# Patient Record
Sex: FEMALE | Race: WHITE | Employment: PART TIME | URBAN - METROPOLITAN AREA
[De-identification: names, ages, dates, MRNs, and addresses within clinical notes are randomized per-mention and may not be internally consistent; named-entity substitution may affect disease eponyms.]

---

## 2021-06-05 ENCOUNTER — HOSPITAL ENCOUNTER (EMERGENCY)
Age: 41
Discharge: HOME | End: 2021-06-05
Payer: MEDICARE

## 2021-06-05 ENCOUNTER — APPOINTMENT (OUTPATIENT)
Dept: GENERAL RADIOLOGY | Age: 41
End: 2021-06-05
Payer: MEDICARE

## 2021-06-05 VITALS
HEART RATE: 98 BPM | TEMPERATURE: 97.9 F | WEIGHT: 185 LBS | RESPIRATION RATE: 18 BRPM | OXYGEN SATURATION: 99 % | DIASTOLIC BLOOD PRESSURE: 64 MMHG | HEIGHT: 65 IN | SYSTOLIC BLOOD PRESSURE: 117 MMHG | BODY MASS INDEX: 30.82 KG/M2

## 2021-06-05 DIAGNOSIS — M25.561 ACUTE PAIN OF RIGHT KNEE: Primary | ICD-10-CM

## 2021-06-05 PROCEDURE — 73562 X-RAY EXAM OF KNEE 3: CPT

## 2021-06-05 PROCEDURE — 73590 X-RAY EXAM OF LOWER LEG: CPT

## 2021-06-05 PROCEDURE — 99285 EMERGENCY DEPT VISIT HI MDM: CPT

## 2021-06-05 RX ORDER — NAPROXEN SODIUM 550 MG/1
550 TABLET ORAL 2 TIMES DAILY WITH MEALS
Qty: 20 TABLET | Refills: 0 | OUTPATIENT
Start: 2021-06-05 | End: 2022-04-23

## 2021-06-05 RX ORDER — LEVOTHYROXINE SODIUM 137 UG/1
137 TABLET ORAL DAILY
COMMUNITY

## 2021-06-05 RX ORDER — MULTIVIT-MIN/IRON/FOLIC ACID/K 18-600-40
CAPSULE ORAL
COMMUNITY

## 2021-06-05 RX ORDER — IBUPROFEN 800 MG/1
800 TABLET ORAL ONCE
Status: DISCONTINUED | OUTPATIENT
Start: 2021-06-05 | End: 2021-06-05 | Stop reason: HOSPADM

## 2021-06-05 ASSESSMENT — PAIN DESCRIPTION - ORIENTATION: ORIENTATION: RIGHT

## 2021-06-05 ASSESSMENT — PAIN DESCRIPTION - DESCRIPTORS: DESCRIPTORS: ACHING

## 2021-06-05 ASSESSMENT — PAIN DESCRIPTION - FREQUENCY: FREQUENCY: CONTINUOUS

## 2021-06-05 ASSESSMENT — PAIN DESCRIPTION - PAIN TYPE: TYPE: ACUTE PAIN

## 2021-06-05 ASSESSMENT — ENCOUNTER SYMPTOMS: BACK PAIN: 0

## 2021-06-05 ASSESSMENT — PAIN DESCRIPTION - LOCATION: LOCATION: KNEE;LEG

## 2021-06-05 ASSESSMENT — PAIN SCALES - GENERAL: PAINLEVEL_OUTOF10: 6

## 2021-06-06 NOTE — ED TRIAGE NOTES
Pt states that she was doing an indoor course and jumped off a 2 ft block to the floor that was padded. Pt states that she felt a \"pop\" when it happened and burning. Pt states this was 3 hours ago, pt states that her pain has been increasing since.

## 2021-06-06 NOTE — ED PROVIDER NOTES
[address] [facility] ED  eMERGENCYdEPARTMENT eNCOUnter      Pt Name: Mavis Powell  MRN: [id number]  [place] 1980of evaluation: 6/5/2021  Anca Acosta PA-C    CHIEF COMPLAINT       Chief Complaint   Patient presents with    Leg Pain         HISTORY OF PRESENT ILLNESS  (Location/Symptom, Timing/Onset, Context/Setting, Quality, Duration, Modifying Factors, Severity.)   Mavis Powell is a 38 y.o. female who presents to the emergency department with a right knee injury. Patient states she was running in obstacle course of the birthday party and slipped off of a padded block. She landed on soft ground. She states her knee while balloon popped when she landed. She denies head injury or loss of consciousness. Did not fall to the room. No history of prior knee injury. No orthopedic preference. No numbness to extremity. No other injuries reported. The history is provided by the patient. Nursing Notes were reviewed and I agree. REVIEW OF SYSTEMS    (2-9 systems for level 4, 10 or more for level 5)     Review of Systems   Musculoskeletal: Positive for arthralgias and gait problem. Negative for back pain and neck pain. Neurological: Negative for weakness and numbness. as noted above the remainder of the review of systems was reviewed and negative. PAST MEDICAL HISTORY     Past Medical History:   Diagnosis Date    Thyroid disease          SURGICAL HISTORY       Past Surgical History:   Procedure Laterality Date    THYROIDECTOMY           CURRENT MEDICATIONS       Previous Medications    CHOLECALCIFEROL (VITAMIN D) 50 MCG (2000 UT) CAPS CAPSULE    Take by mouth    LEVOTHYROXINE (SYNTHROID) 137 MCG TABLET    Take 137 mcg by mouth Daily       ALLERGIES     Patient has no known allergies. HISTORY     History reviewed. No pertinent family history.        SOCIAL HISTORY       Social History     Socioeconomic History    Marital status:      Spouse name: None    Number of children: None    Years of education: None    Highest education level: None   Occupational History    None   Tobacco Use    Smoking status: Former Smoker     Types: Cigarettes    Smokeless tobacco: Never Used   Substance and Sexual Activity    Alcohol use: Not Currently    Drug use: Never    Sexual activity: None   Other Topics Concern    None   Social History Narrative    None     Social Determinants of Health     Financial Resource Strain:     Difficulty of Paying Living Expenses:    Food Insecurity:     Worried About Running Out of Food in the Last Year:     Ran Out of Food in the Last Year:    Transportation Needs:     Lack of Transportation (Medical):  Lack of Transportation (Non-Medical):    Physical Activity:     Days of Exercise per Week:     Minutes of Exercise per Session:    Stress:     Feeling of Stress :    Social Connections:     Frequency of Communication with Friends and Family:     Frequency of Social Gatherings with Friends and Family:     Attends [employer] Services:     Active Member of Clubs or Organizations:     Attends Club or Organization Meetings:     Marital Status:    Intimate Partner Violence:     Fear of Current or Ex-Partner:     Emotionally Abused:     Physically Abused:     Sexually Abused:        SCREENINGS    [place] Coma Scale  Eye Opening: Spontaneous  Best Verbal Response: Oriented  Best Motor Response: Obeys commands  [place] Coma Scale Score: 15      PHYSICAL EXAM    (up to 7 forlevel 4, 8 or more for level 5)     ED Triage Vitals [06/05/21 2018]   BP Temp Temp Source Pulse Resp SpO2 Height Weight   117/64 97.9 °F (36.6 °C) Oral 98 18 99 % 5' 5\" (1.651 m) 185 lb (83.9 kg)       Physical Exam  Vitals and nursing note reviewed. Constitutional:       General: She is not in acute distress. Appearance: She is well-developed. She is not diaphoretic. HENT:      Head: Normocephalic and atraumatic.       Right Ear: External ear painful area. Apply cold compresses intermittently as needed. As pain recedes, begin normal activities slowly as tolerated. Call if symptoms persist.  Ortho follow-up next week for further evaluation. Patient counseled that she may need further studies such as an MRI if her symptoms do not improve. Patient voiced understanding. PROCEDURES:    Procedures      FINAL IMPRESSION      1.  Acute pain of right knee          DISPOSITION/PLAN   DISPOSITION Decision To Discharge 06/05/2021 08:51:32 PM      PATIENT REFERRED TO:  [facility]  [facility] [address]  [address] [address]  [place] [address]  [phone]  In 2 days        DISCHARGE MEDICATIONS:  New Prescriptions    NAPROXEN SODIUM (ANAPROX DS) 550 MG TABLET    Take 1 tablet by mouth 2 times daily (with meals)       (Please note thatportions of this note were completed with a voice recognition program.  Efforts were made to edit the dictations but occasionally words are mis-transcribed.)    ASHWIN Cabrera PA-C  06/05/21 2053

## 2022-04-23 ENCOUNTER — APPOINTMENT (OUTPATIENT)
Dept: GENERAL RADIOLOGY | Age: 42
End: 2022-04-23
Payer: COMMERCIAL

## 2022-04-23 ENCOUNTER — HOSPITAL ENCOUNTER (EMERGENCY)
Age: 42
Discharge: HOME | End: 2022-04-23
Attending: STUDENT IN AN ORGANIZED HEALTH CARE EDUCATION/TRAINING PROGRAM
Payer: COMMERCIAL

## 2022-04-23 VITALS
WEIGHT: 190 LBS | SYSTOLIC BLOOD PRESSURE: 109 MMHG | BODY MASS INDEX: 31.65 KG/M2 | DIASTOLIC BLOOD PRESSURE: 79 MMHG | OXYGEN SATURATION: 98 % | HEIGHT: 65 IN | RESPIRATION RATE: 18 BRPM | TEMPERATURE: 98.4 F | HEART RATE: 95 BPM

## 2022-04-23 DIAGNOSIS — S52.125A CLOSED NONDISPLACED FRACTURE OF HEAD OF LEFT RADIUS, INITIAL ENCOUNTER: Primary | ICD-10-CM

## 2022-04-23 PROCEDURE — 29105 APPLICATION LONG ARM SPLINT: CPT

## 2022-04-23 PROCEDURE — 73080 X-RAY EXAM OF ELBOW: CPT

## 2022-04-23 PROCEDURE — 73090 X-RAY EXAM OF FOREARM: CPT

## 2022-04-23 PROCEDURE — 99283 EMERGENCY DEPT VISIT LOW MDM: CPT

## 2022-04-23 RX ORDER — HYDROCODONE BITARTRATE AND ACETAMINOPHEN 5; 325 MG/1; MG/1
1 TABLET ORAL EVERY 6 HOURS PRN
Qty: 12 TABLET | Refills: 0 | Status: SHIPPED | OUTPATIENT
Start: 2022-04-23 | End: 2022-04-26

## 2022-04-23 RX ORDER — HYDROCODONE BITARTRATE AND ACETAMINOPHEN 5; 325 MG/1; MG/1
1 TABLET ORAL ONCE
Status: DISCONTINUED | OUTPATIENT
Start: 2022-04-23 | End: 2022-04-23 | Stop reason: HOSPADM

## 2022-04-23 ASSESSMENT — PAIN DESCRIPTION - PAIN TYPE: TYPE: ACUTE PAIN

## 2022-04-23 ASSESSMENT — PAIN DESCRIPTION - FREQUENCY: FREQUENCY: INTERMITTENT

## 2022-04-23 ASSESSMENT — PAIN - FUNCTIONAL ASSESSMENT
PAIN_FUNCTIONAL_ASSESSMENT: 0-10
PAIN_FUNCTIONAL_ASSESSMENT: NONE - DENIES PAIN

## 2022-04-23 ASSESSMENT — PAIN DESCRIPTION - DESCRIPTORS: DESCRIPTORS: ACHING

## 2022-04-23 ASSESSMENT — PAIN DESCRIPTION - LOCATION: LOCATION: ARM

## 2022-04-23 ASSESSMENT — PAIN SCALES - GENERAL: PAINLEVEL_OUTOF10: 9

## 2022-04-23 ASSESSMENT — PAIN DESCRIPTION - ORIENTATION: ORIENTATION: LEFT

## 2022-04-24 NOTE — ED TRIAGE NOTES
PT COMES TO THE ED TODAY WITH COMPLAINTS OF A ARM INJURY   PT STATES SHE FELL ON HER ARM DURING ROLLER SKATING     PT DENIES ANY HEAD INJURY OR LOC     PT IS ABLE TO MOVE ARM WITH CERTAIN MOVEMENT BUT IS HAVING A HARD TIME FLEXING IT STRAIGHTOUT    PT STABLE IN TRIAGE

## 2022-04-24 NOTE — ED PROVIDER NOTES
[address] [facility] ED  eMERGENCY dEPARTMENT eNCOUnter      Pt Name: Nishi Stanley  MRN: [id number]  [place] 1980  Date of evaluation: 4/23/2022  Provider: Rachel Saavedra PA-C    CHIEF COMPLAINT       Chief Complaint   Patient presents with    Arm Injury         HISTORY OF PRESENT ILLNESS   (Location/Symptom, Timing/Onset,Context/Setting, Quality, Duration, Modifying Factors, Severity)  Note limiting factors. Nishi Stanley is a 42 y.o. female who presents to the emergency department patient fell rollerskating has left arm pain denies any additional he denies headache neck pain denies head injury denies loss of consciousness. Worse with touch motion or trying to straighten out. Symptoms moderate severity. HPI    NursingNotes were reviewed. REVIEW OF SYSTEMS    (2-9 systems for level 4, 10 or more for level 5)     Review of Systems   Constitutional: Negative for activity change, appetite change and unexpected weight change. HENT: Negative for ear discharge, nosebleeds and voice change. Eyes: Negative for discharge. Respiratory: Negative for apnea. Cardiovascular: Negative for chest pain. Gastrointestinal: Negative for abdominal distention, anal bleeding, nausea and vomiting. Endocrine: Negative for polyuria. Genitourinary: Negative for dysuria and hematuria. Musculoskeletal: Positive for arthralgias. Negative for back pain, joint swelling and neck pain. Skin: Negative for pallor. Neurological: Negative for dizziness and headaches. Psychiatric/Behavioral: Negative for behavioral problems, self-injury and sleep disturbance. All other systems reviewed and are negative. Except as noted above the remainder of the review of systems was reviewed and negative.        PAST MEDICAL HISTORY     Past Medical History:   Diagnosis Date    Thyroid disease          SURGICALHISTORY       Past Surgical History:   Procedure Laterality Date    THYROIDECTOMY           CURRENT MEDICATIONS       Discharge Medication List as of 4/23/2022  9:37 PM      CONTINUE these medications which have NOT CHANGED    Details   levothyroxine (SYNTHROID) 137 MCG tablet Take 137 mcg by mouth DailyHistorical Med      Cholecalciferol (VITAMIN D) 50 MCG (2000 UT) CAPS capsule Take by mouthHistorical Med                  Patient has no known allergies. FAMILY HISTORY     History reviewed. No pertinent family history. SOCIAL HISTORY       Social History     Socioeconomic History    Marital status:      Spouse name: None    Number of children: None    Years of education: None    Highest education level: None   Occupational History    None   Tobacco Use    Smoking status: Former Smoker     Types: Cigarettes    Smokeless tobacco: Never Used   Substance and Sexual Activity    Alcohol use: Not Currently    Drug use: Never    Sexual activity: None   Other Topics Concern    None   Social History Narrative    None     Social Determinants of Health     Financial Resource Strain:     Difficulty of Paying Living Expenses: Not on file   Food Insecurity:     Worried About Running Out of Food in the Last Year: Not on file    Susy of Food in the Last Year: Not on file   Transportation Needs:     Lack of Transportation (Medical): Not on file    Lack of Transportation (Non-Medical):  Not on file   Physical Activity:     Days of Exercise per Week: Not on file    Minutes of Exercise per Session: Not on file   Stress:     Feeling of Stress : Not on file   Social Connections:     Frequency of Communication with Friends and Family: Not on file    Frequency of Social Gatherings with Friends and Family: Not on file    Attends Bahai Services: Not on file    Active Member of Clubs or Organizations: Not on file    Attends Club or Organization Meetings: Not on file    Marital Status: Not on file   Intimate Partner Violence:     Fear of Current or Ex-Partner: Not on file   [employer] Abused: Not on file    Physically Abused: Not on file    Sexually Abused: Not on file   Housing Stability:     Unable to Pay for Housing in the Last Year: Not on file    Number of [place] in the Last Year: Not on file    Unstable Housing in the Last Year: Not on file       SCREENINGS   [place] Coma Scale  Eye Opening: Spontaneous  Best Verbal Response: Oriented  Best Motor Response: Obeys commands  [place] Coma Scale Score: 15  Ebola Virus Disease (EVD) Screening   Temp: 98.4 °F (36.9 °C)  CIWA Assessment  BP: 109/79  Pulse: 95    PHYSICAL EXAM    (up to 7 for level 4, 8 or more for level 5)     ED Triage Vitals [04/23/22 2029]   BP Temp Temp Source Pulse Resp SpO2 Height Weight   109/79 98.4 °F (36.9 °C) Oral 95 18 98 % 5' 5\" (1.651 m) 190 lb (86.2 kg)       Physical Exam  Vitals and nursing note reviewed. Constitutional:       General: She is not in acute distress. Appearance: She is well-developed. HENT:      Head: Normocephalic and atraumatic. Right Ear: External ear normal.      Left Ear: External ear normal.      Nose: Nose normal.      Mouth/Throat:      Mouth: Mucous membranes are moist.   Eyes:      General:         Right eye: No discharge. Left eye: No discharge. Pupils: Pupils are equal, round, and reactive to light. Cardiovascular:      Rate and Rhythm: Normal rate and regular rhythm. Pulses: Normal pulses. Heart sounds: Normal heart sounds. Pulmonary:      Effort: Pulmonary effort is normal. No respiratory distress. Breath sounds: Normal breath sounds. No stridor. Abdominal:      General: Bowel sounds are normal. There is no distension. Palpations: Abdomen is soft. Musculoskeletal:         General: Tenderness present. Normal range of motion. Cervical back: Normal range of motion and neck supple. No tenderness. Skin:     General: Skin is warm. Capillary Refill: Capillary refill takes less than 2 seconds.       Findings: No erythema. Neurological:      General: No focal deficit present. Mental Status: She is alert and oriented to person, place, and time. Psychiatric:         Mood and Affect: Mood normal.         RESULTS     EKG: All EKG's are interpreted by the Emergency Department Physician who either signs or Co-signsthis chart in the absence of a cardiologist.         RADIOLOGY:   Elissa Spencer such as CT, Ultrasound and MRI are read by the radiologist. Plain radiographic images are visualized and preliminarily interpreted by the emergency physician with the below findings:     + fx    Interpretation per the Radiologist below, if available at the time ofthis note:    XR RADIUS ULNA LEFT (2 VIEWS)   Final Result      Nondisplaced fracture of the radial head. XR ELBOW LEFT (MIN 3 VIEWS)   Final Result      Nondisplaced fracture of the radial head. ED BEDSIDE ULTRASOUND:   Performed by ED Physician - none    LABS:  Labs Reviewed - No data to display    All other labs were within normal range or not returned as of this dictation. EMERGENCY DEPARTMENT COURSE and DIFFERENTIAL DIAGNOSIS/MDM:   Vitals:    Vitals:    04/23/22 2029   BP: 109/79   Pulse: 95   Resp: 18   Temp: 98.4 °F (36.9 °C)   TempSrc: Oral   SpO2: 98%   Weight: 190 lb (86.2 kg)   Height: 5' 5\" (1.651 m)            MDM    CRITICAL CARE TIME           CONSULTS:  None    PROCEDURES:  Unless otherwise noted below, none     Procedures    FINAL IMPRESSION      1.  Closed nondisplaced fracture of head of left radius, initial encounter          DISPOSITION/PLAN   DISPOSITION Decision To Discharge 04/23/2022 09:48:03 PM      PATIENT REFERRED TO:  [facility]) ED  [address]  [phone]  Go to   If symptoms worsen; hand or arm turns blue, loss of sensation to the fingers, unable to move fingers    Kailey Avalos MD  [address] [place] [address] [phone]    Call in 2 days  To arrange a follow up visit. DISCHARGE MEDICATIONS:  Discharge Medication List as of 4/23/2022  9:37 PM      START taking these medications    Details   HYDROcodone-acetaminophen (NORCO) 5-325 MG per tablet Take 1 tablet by mouth every 6 hours as needed for Pain for up to 3 days. Intended supply: 3 days.  Take lowest dose possible to manage pain, Disp-12 tablet, R-0Print                (Please note that portions of this note were completed with a voice recognition program.  Efforts were made to edit the dictations but occasionally words are mis-transcribed.)    Han Bear PA-C (electronically signed)  Attending Emergency Physician       Han Bear PA-C  05/02/22 SLIM [address] 23

## 2022-04-24 NOTE — ED NOTES
Long arm splint applied as directed by Dr. Jus Brennan. Patient tolerated well. Good cap refill to fingers. Sling applied also as ordered by Dr. Jus Brennan.      Gomez Garcia RN  04/23/22 2814

## 2022-05-02 ASSESSMENT — ENCOUNTER SYMPTOMS
EYE DISCHARGE: 0
ANAL BLEEDING: 0
BACK PAIN: 0
VOMITING: 0
VOICE CHANGE: 0
APNEA: 0
NAUSEA: 0
ABDOMINAL DISTENTION: 0

## 2022-10-16 ENCOUNTER — HOSPITAL ENCOUNTER (EMERGENCY)
Age: 42
Discharge: HOME | End: 2022-10-16
Payer: COMMERCIAL

## 2022-10-16 ENCOUNTER — APPOINTMENT (OUTPATIENT)
Dept: CT IMAGING | Age: 42
End: 2022-10-16
Payer: COMMERCIAL

## 2022-10-16 VITALS
TEMPERATURE: 97.8 F | BODY MASS INDEX: 31.65 KG/M2 | RESPIRATION RATE: 16 BRPM | HEIGHT: 65 IN | SYSTOLIC BLOOD PRESSURE: 115 MMHG | WEIGHT: 190 LBS | DIASTOLIC BLOOD PRESSURE: 64 MMHG | OXYGEN SATURATION: 96 % | HEART RATE: 80 BPM

## 2022-10-16 DIAGNOSIS — R10.12 LEFT UPPER QUADRANT ABDOMINAL PAIN: Primary | ICD-10-CM

## 2022-10-16 DIAGNOSIS — R11.2 NAUSEA AND VOMITING, UNSPECIFIED VOMITING TYPE: ICD-10-CM

## 2022-10-16 DIAGNOSIS — K80.20 CALCULUS OF GALLBLADDER WITHOUT CHOLECYSTITIS WITHOUT OBSTRUCTION: ICD-10-CM

## 2022-10-16 LAB
ALBUMIN SERPL-MCNC: 4.4 G/DL (ref 3.5–4.6)
ALP BLD-CCNC: 60 U/L (ref 40–130)
ALT SERPL-CCNC: 22 U/L (ref 0–33)
ANION GAP SERPL CALCULATED.3IONS-SCNC: 12 MEQ/L (ref 9–15)
AST SERPL-CCNC: 21 U/L (ref 0–35)
BACTERIA: NEGATIVE /HPF
BASOPHILS ABSOLUTE: 0.1 K/UL (ref 0–0.2)
BASOPHILS RELATIVE PERCENT: 0.9 %
BILIRUB SERPL-MCNC: 0.3 MG/DL (ref 0.2–0.7)
BILIRUBIN URINE: NEGATIVE
BLOOD, URINE: ABNORMAL
BUN BLDV-MCNC: 16 MG/DL (ref 6–20)
CALCIUM SERPL-MCNC: 9.4 MG/DL (ref 8.5–9.9)
CHLORIDE BLD-SCNC: 100 MEQ/L (ref 95–107)
CLARITY: CLEAR
CO2: 25 MEQ/L (ref 20–31)
COLOR: YELLOW
CREAT SERPL-MCNC: 0.8 MG/DL (ref 0.5–0.9)
EOSINOPHILS ABSOLUTE: 0.1 K/UL (ref 0–0.7)
EOSINOPHILS RELATIVE PERCENT: 1.5 %
EPITHELIAL CELLS, UA: ABNORMAL /HPF (ref 0–5)
GFR AFRICAN AMERICAN: >60
GFR AFRICAN AMERICAN: >60
GFR NON-AFRICAN AMERICAN: >60
GFR NON-AFRICAN AMERICAN: >60
GLOBULIN: 2.9 G/DL (ref 2.3–3.5)
GLUCOSE BLD-MCNC: 151 MG/DL (ref 70–99)
GLUCOSE URINE: NEGATIVE MG/DL
HCG, URINE, POC: NEGATIVE
HCT VFR BLD CALC: 39.3 % (ref 37–47)
HEMOGLOBIN: 13.4 G/DL (ref 12–16)
HYALINE CASTS: ABNORMAL /HPF (ref 0–5)
KETONES, URINE: NEGATIVE MG/DL
LEUKOCYTE ESTERASE, URINE: NEGATIVE
LIPASE: 25 U/L (ref 12–95)
LYMPHOCYTES ABSOLUTE: 1.4 K/UL (ref 1–4.8)
LYMPHOCYTES RELATIVE PERCENT: 17.1 %
Lab: NORMAL
MCH RBC QN AUTO: 32.5 PG (ref 27–31.3)
MCHC RBC AUTO-ENTMCNC: 34 % (ref 33–37)
MCV RBC AUTO: 95.7 FL (ref 82–100)
MONOCYTES ABSOLUTE: 0.7 K/UL (ref 0.2–0.8)
MONOCYTES RELATIVE PERCENT: 8.8 %
NEGATIVE QC PASS/FAIL: NORMAL
NEUTROPHILS ABSOLUTE: 5.8 K/UL (ref 1.4–6.5)
NEUTROPHILS RELATIVE PERCENT: 71.7 %
NITRITE, URINE: NEGATIVE
PDW BLD-RTO: 12.5 % (ref 11.5–14.5)
PERFORMED ON: NORMAL
PH UA: 6 (ref 5–9)
PLATELET # BLD: 217 K/UL (ref 130–400)
POC CREATININE: 0.7 MG/DL (ref 0.6–1.2)
POC SAMPLE TYPE: NORMAL
POSITIVE QC PASS/FAIL: NORMAL
POTASSIUM SERPL-SCNC: 4.5 MEQ/L (ref 3.4–4.9)
PROTEIN UA: NEGATIVE MG/DL
RBC # BLD: 4.11 M/UL (ref 4.2–5.4)
RBC UA: ABNORMAL /HPF (ref 0–5)
SODIUM BLD-SCNC: 137 MEQ/L (ref 135–144)
SPECIFIC GRAVITY UA: 1.02 (ref 1–1.03)
TOTAL PROTEIN: 7.3 G/DL (ref 6.3–8)
URINE REFLEX TO CULTURE: ABNORMAL
UROBILINOGEN, URINE: 0.2 E.U./DL
WBC # BLD: 8.1 K/UL (ref 4.8–10.8)
WBC UA: ABNORMAL /HPF (ref 0–5)

## 2022-10-16 PROCEDURE — 6360000002 HC RX W HCPCS: Performed by: PHYSICIAN ASSISTANT

## 2022-10-16 PROCEDURE — 80053 COMPREHEN METABOLIC PANEL: CPT

## 2022-10-16 PROCEDURE — 2580000003 HC RX 258: Performed by: PHYSICIAN ASSISTANT

## 2022-10-16 PROCEDURE — 99285 EMERGENCY DEPT VISIT HI MDM: CPT

## 2022-10-16 PROCEDURE — 85025 COMPLETE CBC W/AUTO DIFF WBC: CPT

## 2022-10-16 PROCEDURE — 81001 URINALYSIS AUTO W/SCOPE: CPT

## 2022-10-16 PROCEDURE — 96375 TX/PRO/DX INJ NEW DRUG ADDON: CPT

## 2022-10-16 PROCEDURE — 83690 ASSAY OF LIPASE: CPT

## 2022-10-16 PROCEDURE — 74177 CT ABD & PELVIS W/CONTRAST: CPT

## 2022-10-16 PROCEDURE — 6360000004 HC RX CONTRAST MEDICATION: Performed by: PHYSICIAN ASSISTANT

## 2022-10-16 PROCEDURE — 36415 COLL VENOUS BLD VENIPUNCTURE: CPT

## 2022-10-16 PROCEDURE — 96374 THER/PROPH/DIAG INJ IV PUSH: CPT

## 2022-10-16 RX ORDER — KETOROLAC TROMETHAMINE 30 MG/ML
30 INJECTION, SOLUTION INTRAMUSCULAR; INTRAVENOUS ONCE
Status: COMPLETED | OUTPATIENT
Start: 2022-10-16 | End: 2022-10-16

## 2022-10-16 RX ORDER — ONDANSETRON 2 MG/ML
4 INJECTION INTRAMUSCULAR; INTRAVENOUS ONCE
Status: COMPLETED | OUTPATIENT
Start: 2022-10-16 | End: 2022-10-16

## 2022-10-16 RX ORDER — 0.9 % SODIUM CHLORIDE 0.9 %
500 INTRAVENOUS SOLUTION INTRAVENOUS ONCE
Status: COMPLETED | OUTPATIENT
Start: 2022-10-16 | End: 2022-10-16

## 2022-10-16 RX ORDER — ONDANSETRON 4 MG/1
4 TABLET, ORALLY DISINTEGRATING ORAL EVERY 8 HOURS PRN
Qty: 20 TABLET | Refills: 0 | Status: SHIPPED | OUTPATIENT
Start: 2022-10-16

## 2022-10-16 RX ORDER — DICYCLOMINE HYDROCHLORIDE 10 MG/1
10 CAPSULE ORAL EVERY 6 HOURS PRN
Qty: 20 CAPSULE | Refills: 0 | Status: SHIPPED | OUTPATIENT
Start: 2022-10-16

## 2022-10-16 RX ADMIN — IOPAMIDOL 50 ML: 612 INJECTION, SOLUTION INTRAVENOUS at 06:43

## 2022-10-16 RX ADMIN — SODIUM CHLORIDE 500 ML: 9 INJECTION, SOLUTION INTRAVENOUS at 04:51

## 2022-10-16 RX ADMIN — ONDANSETRON 4 MG: 2 INJECTION INTRAMUSCULAR; INTRAVENOUS at 04:53

## 2022-10-16 RX ADMIN — KETOROLAC TROMETHAMINE 30 MG: 30 INJECTION, SOLUTION INTRAMUSCULAR at 05:30

## 2022-10-16 ASSESSMENT — ENCOUNTER SYMPTOMS
COUGH: 0
EYE DISCHARGE: 0
SHORTNESS OF BREATH: 0
CONSTIPATION: 0
ABDOMINAL DISTENTION: 0
ABDOMINAL PAIN: 1
RHINORRHEA: 0
BACK PAIN: 1
COLOR CHANGE: 0
VOMITING: 1
SORE THROAT: 0
ANAL BLEEDING: 0
NAUSEA: 1
VOICE CHANGE: 0

## 2022-10-16 ASSESSMENT — PAIN DESCRIPTION - LOCATION
LOCATION: ABDOMEN
LOCATION: ABDOMEN

## 2022-10-16 ASSESSMENT — PAIN DESCRIPTION - DESCRIPTORS: DESCRIPTORS: ACHING;BURNING

## 2022-10-16 ASSESSMENT — PAIN - FUNCTIONAL ASSESSMENT: PAIN_FUNCTIONAL_ASSESSMENT: 0-10

## 2022-10-16 ASSESSMENT — PAIN SCALES - GENERAL
PAINLEVEL_OUTOF10: 9
PAINLEVEL_OUTOF10: 8

## 2022-10-16 NOTE — ED PROVIDER NOTES
[address] [facility] [place] ED  eMERGENCY dEPARTMENT eNCOUnter      Pt Name: Danish Pérez  MRN: [id number]  [place] 1980  Date of evaluation: 10/16/2022  Provider: Chris Mason PA-C    CHIEF COMPLAINT       Chief Complaint   Patient presents with    Abdominal Pain    Back Pain         HISTORY OF PRESENT ILLNESS   (Location/Symptom, Timing/Onset,Context/Setting, Quality, Duration, Modifying Factors, Severity)  Note limiting factors. Danish Pérez is a 36 y.o. female who presents to the emergency department   abdominal pain / back pain she thought maybe perhaps due to constipation but had small bowel movement home after Dulcolax she does have nauseous and vomiting at this time denies fever chills pain burning frequent urination symptoms moderate severity    HPI    NursingNotes were reviewed. REVIEW OF SYSTEMS    (2-9 systems for level 4, 10 or more for level 5)     Review of Systems   Constitutional:  Negative for activity change, appetite change and unexpected weight change. HENT:  Negative for ear discharge, nosebleeds and voice change. Eyes:  Negative for discharge. Respiratory:  Negative for cough. Cardiovascular:  Negative for chest pain. Gastrointestinal:  Positive for abdominal pain, nausea and vomiting. Negative for abdominal distention and anal bleeding. Genitourinary:  Negative for dysuria and hematuria. Musculoskeletal:  Negative for joint swelling. Skin:  Negative for pallor. Neurological:  Negative for weakness. Hematological:  Does not bruise/bleed easily. Psychiatric/Behavioral:  Negative for behavioral problems and self-injury. All other systems reviewed and are negative. Except as noted above the remainder of the review of systems was reviewed and negative.        PAST MEDICAL HISTORY     Past Medical History:   Diagnosis Date    Thyroid disease          SURGICALHISTORY       Past Surgical History:   Procedure Laterality Date    THYROIDECTOMY CURRENT MEDICATIONS       Discharge Medication List as of 10/16/2022  8:11 AM        CONTINUE these medications which have NOT CHANGED    Details   levothyroxine (SYNTHROID) 137 MCG tablet Take 137 mcg by mouth DailyHistorical Med      Cholecalciferol (VITAMIN D) 50 MCG (2000 UT) CAPS capsule Take by mouthHistorical Med                  Patient has no known allergies. FAMILY HISTORY     History reviewed. No pertinent family history. SOCIAL HISTORY       Social History     Socioeconomic History    Marital status:      Spouse name: None    Number of children: None    Years of education: None    Highest education level: None   Tobacco Use    Smoking status: Former     Types: Cigarettes    Smokeless tobacco: Never   Substance and Sexual Activity    Alcohol use: Not Currently    Drug use: Never       SCREENINGS   [place] Coma Scale  Eye Opening: Spontaneous  Best Verbal Response: Oriented  Best Motor Response: Obeys commands  [place] Coma Scale Score: 15  Ebola Virus Disease (EVD) Screening   Temp: 97.8 °F (36.6 °C)  CIWA Assessment  BP: 115/64  Heart Rate: 80    PHYSICAL EXAM    (up to 7 for level 4, 8 or more for level 5)     ED Triage Vitals [10/16/22 2119]   BP Temp Temp src Heart Rate Resp SpO2 Height Weight   137/79 97.8 °F (36.6 °C) -- 77 16 99 % 5' 5\" (1.651 m) 190 lb (86.2 kg)       Physical Exam  Vitals and nursing note reviewed. Constitutional:       General: She is not in acute distress. Appearance: She is well-developed. HENT:      Head: Normocephalic and atraumatic. Right Ear: External ear normal.      Left Ear: External ear normal.      Nose: Nose normal.      Mouth/Throat:      Mouth: Mucous membranes are moist.   Eyes:      General:         Right eye: No discharge. Left eye: No discharge. Pupils: Pupils are equal, round, and reactive to light. Cardiovascular:      Rate and Rhythm: Regular rhythm. Heart sounds: Normal heart sounds.    Pulmonary: Effort: Pulmonary effort is normal. No respiratory distress. Breath sounds: Normal breath sounds. No stridor. Abdominal:      General: Bowel sounds are normal. There is no distension. Palpations: Abdomen is soft. Tenderness: There is abdominal tenderness in the right upper quadrant and left upper quadrant. Musculoskeletal:         General: Normal range of motion. Cervical back: Normal range of motion and neck supple. Skin:     General: Skin is warm. Findings: No erythema. Neurological:      Mental Status: She is alert and oriented to person, place, and time. Psychiatric:         Mood and Affect: Mood normal.       RESULTS     EKG: All EKG's are interpreted by the Emergency Department Physician who either signs or Co-signsthis chart in the absence of a cardiologist.         RADIOLOGY:   Betty Salmons such as CT, Ultrasound and MRI are read by the radiologist. Plain radiographic images are visualized and preliminarily interpreted by the emergency physician with the below findings:         Interpretation per the Radiologist below, if available at the time ofthis note:    CT ABDOMEN PELVIS W IV CONTRAST Additional Contrast? None   Final Result   No acute abdominopelvic process. Hepatic steatosis and mild hepatomegaly.                ED BEDSIDE ULTRASOUND:   Performed by ED Physician - none    LABS:  Labs Reviewed   COMPREHENSIVE METABOLIC PANEL - Abnormal; Notable for the following components:       Result Value    Glucose 151 (*)     All other components within normal limits   CBC WITH AUTO DIFFERENTIAL - Abnormal; Notable for the following components:    RBC 4.11 (*)     MCH 32.5 (*)     All other components within normal limits   URINALYSIS WITH REFLEX TO CULTURE - Abnormal; Notable for the following components:    Blood, Urine MODERATE (*)     All other components within normal limits   MICROSCOPIC URINALYSIS - Abnormal; Notable for the following components:    RBC, UA 20-50 (*)     All other components within normal limits   LIPASE   POC PREGNANCY UR-QUAL   POCT VENOUS       All other labs were within normal range or not returned as of this dictation. EMERGENCY DEPARTMENT COURSE and DIFFERENTIAL DIAGNOSIS/MDM:   Vitals:    Vitals:    10/16/22 0600 10/16/22 0630 10/16/22 0700 10/16/22 0800   BP: 124/63 117/69 115/64    Pulse:   80 80   Resp:   16 16   Temp:       SpO2: 97% 97% 96%    Weight:       Height:                MDM      CRITICAL CARE TIME        CONSULTS:  None    PROCEDURES:  Unless otherwise noted below, none     Procedures    FINAL IMPRESSION      1. Left upper quadrant abdominal pain    2. Nausea and vomiting, unspecified vomiting type    3. Calculus of gallbladder without cholecystitis without obstruction          DISPOSITION/PLAN   DISPOSITION Decision To Discharge 10/16/2022 08:02:14 AM      PATIENT REFERRED TO:  Josh Goncalves DO  [address] [address]  [phone]    In 3 days      Tiara Pollard MD  [address] [facility] [address]  [address] [address]  [phone]    In 1 week      DISCHARGE MEDICATIONS:  Discharge Medication List as of 10/16/2022  8:11 AM        START taking these medications    Details   ondansetron (ZOFRAN ODT) 4 MG disintegrating tablet Take 1 tablet by mouth every 8 hours as needed for Nausea, Disp-20 tablet, R-0Print      dicyclomine (BENTYL) 10 MG capsule Take 1 capsule by mouth every 6 hours as needed (cramps), Disp-20 capsule, R-0Print                (Please note that portions of this note were completed with a voice recognition program.  Efforts were made to edit the dictations but occasionally words are mis-transcribed.)    Ede Mccallum PA-C (electronically signed)  Attending Emergency Physician        Ede Mccallum PA-C  10/18/22 6841

## 2022-10-16 NOTE — ED PROVIDER NOTES
[address] [facility] ED  eMERGENCY dEPARTMENT eNCOUnter      Pt Name: Christie Reina  MRN: [id number]  [place] 1980  Date of evaluation: 10/16/2022  Provider: Venancio Majano PA-C    CHIEF COMPLAINT       Chief Complaint   Patient presents with    Abdominal Pain    Back Pain         HISTORY OF PRESENT ILLNESS   (Location/Symptom, Timing/Onset,Context/Setting, Quality, Duration, Modifying Factors, Severity)  Note limiting factors. Christie Reina is a 36 y.o. female who presents to the emergency department complaint of upper midepigastric abdominal pain left upper quadrant, with radiation around her flank which patient states started around midnight last evening. Patient states mild nausea, no vomiting, no fevers, no urinary complaints constipation or diarrhea. She is rating her pain as a 9 out of 10. Has medical history per chart review thyroid disease. HPI    NursingNotes were reviewed. REVIEW OF SYSTEMS    (2-9 systems for level 4, 10 or more for level 5)     Review of Systems   Constitutional:  Negative for activity change and appetite change. HENT:  Negative for congestion, ear discharge, ear pain, nosebleeds, rhinorrhea and sore throat. Eyes:  Negative for discharge. Respiratory:  Negative for shortness of breath. Cardiovascular:  Negative for chest pain, palpitations and leg swelling. Gastrointestinal:  Positive for abdominal pain and nausea. Negative for abdominal distention and constipation. Genitourinary:  Negative for difficulty urinating and dysuria. Musculoskeletal:  Positive for back pain. Negative for arthralgias. Skin:  Negative for color change, pallor, rash and wound. Neurological:  Negative for dizziness, tremors, syncope, weakness, numbness and headaches. Psychiatric/Behavioral:  Negative for agitation and confusion. Except as noted above the remainder of the review of systems was reviewed and negative.        PAST MEDICAL HISTORY     Past Medical History:   Diagnosis Date    Thyroid disease          SURGICALHISTORY       Past Surgical History:   Procedure Laterality Date    THYROIDECTOMY           CURRENT MEDICATIONS       Previous Medications    CHOLECALCIFEROL (VITAMIN D) 50 MCG (2000 UT) CAPS CAPSULE    Take by mouth    LEVOTHYROXINE (SYNTHROID) 137 MCG TABLET    Take 137 mcg by mouth Daily       ALLERGIES     Patient has no known allergies. FAMILY HISTORY     History reviewed. No pertinent family history. SOCIAL HISTORY       Social History     Socioeconomic History    Marital status:      Spouse name: None    Number of children: None    Years of education: None    Highest education level: None   Tobacco Use    Smoking status: Former     Types: Cigarettes    Smokeless tobacco: Never   Substance and Sexual Activity    Alcohol use: Not Currently    Drug use: Never       SCREENINGS    Shelby Coma Scale  Eye Opening: Spontaneous  Best Verbal Response: Oriented  Best Motor Response: Obeys commands  Jenkins Coma Scale Score: 15 @FLOW([id number])@      PHYSICAL EXAM    (up to 7 for level 4, 8 or more for level 5)     ED Triage Vitals [10/16/22 4999]   BP Temp Temp src Heart Rate Resp SpO2 Height Weight   137/79 97.8 °F (36.6 °C) -- 77 16 99 % 5' 5\" (1.651 m) 190 lb (86.2 kg)       Physical Exam  Vitals and nursing note reviewed. Constitutional:       General: She is not in acute distress. Appearance: She is well-developed. She is not ill-appearing, toxic-appearing or diaphoretic. HENT:      Head: Normocephalic. Nose: No congestion. Mouth/Throat:      Mouth: Mucous membranes are moist.      Pharynx: No oropharyngeal exudate or posterior oropharyngeal erythema. Eyes:      Extraocular Movements: Extraocular movements intact. Conjunctiva/sclera: Conjunctivae normal.      Pupils: Pupils are equal, round, and reactive to light. Neck:      Vascular: No JVD. Trachea: No tracheal deviation.    Cardiovascular:      Rate and Rhythm: Normal rate. Pulses: Normal pulses. Heart sounds: Normal heart sounds. No murmur heard. No friction rub. No gallop. Pulmonary:      Effort: Pulmonary effort is normal. No tachypnea, accessory muscle usage, respiratory distress or retractions. Breath sounds: No stridor. No wheezing, rhonchi or rales. Chest:      Chest wall: No tenderness. Abdominal:      General: Abdomen is flat. Bowel sounds are normal. There is no distension or abdominal bruit. Palpations: There is no shifting dullness, fluid wave, hepatomegaly, splenomegaly, mass or pulsatile mass. Tenderness: There is abdominal tenderness in the epigastric area. There is no right CVA tenderness, left CVA tenderness, guarding or rebound. Negative signs include Lindsey's sign, Rovsing's sign and McBurney's sign. Musculoskeletal:         General: No deformity. Cervical back: Normal range of motion and neck supple. No rigidity. Skin:     General: Skin is warm and dry. Capillary Refill: Capillary refill takes less than 2 seconds. Coloration: Skin is not jaundiced. Neurological:      General: No focal deficit present. Mental Status: She is alert and oriented to person, place, and time. Mental status is at baseline. Cranial Nerves: No cranial nerve deficit. Sensory: No sensory deficit. Motor: No weakness.       Coordination: Coordination normal.   Psychiatric:         Mood and Affect: Mood normal.       DIAGNOSTIC RESULTS     EKG: All EKG's are interpreted by the Emergency Department Physician who either signs or Co-signsthis chart in the absence of a cardiologist.        RADIOLOGY:   Non-plain filmimages such as CT, Ultrasound and MRI are read by the radiologist. Plain radiographic images are visualized and preliminarily interpreted by the emergency physician with the below findings:    Interpretation per the Radiologist below, if available at the time ofthis note:    [address] W IV CONTRAST Additional Contrast? None   Final Result   No acute abdominopelvic process. Hepatic steatosis and mild hepatomegaly. ED BEDSIDE ULTRASOUND:   Performed by ED Physician - none    LABS:  Labs Reviewed   COMPREHENSIVE METABOLIC PANEL - Abnormal; Notable for the following components:       Result Value    Glucose 151 (*)     All other components within normal limits   CBC WITH AUTO DIFFERENTIAL - Abnormal; Notable for the following components:    RBC 4.11 (*)     MCH 32.5 (*)     All other components within normal limits   URINALYSIS WITH REFLEX TO CULTURE - Abnormal; Notable for the following components:    Blood, Urine MODERATE (*)     All other components within normal limits   MICROSCOPIC URINALYSIS - Abnormal; Notable for the following components:    RBC, UA 20-50 (*)     All other components within normal limits   LIPASE   POC PREGNANCY UR-QUAL   POCT VENOUS       All other labs were within normal range or not returned as of this dictation. EMERGENCY DEPARTMENT COURSE and DIFFERENTIAL DIAGNOSIS/MDM:   Vitals:    Vitals:    10/16/22 0600 10/16/22 0630 10/16/22 0700 10/16/22 0800   BP: 124/63 117/69 115/64    Pulse:   80 80   Resp:   16 16   Temp:       SpO2: 97% 97% 96%    Weight:       Height:                MDM  Number of Diagnoses or Management Options  Calculus of gallbladder without cholecystitis without obstruction  Left upper quadrant abdominal pain  Nausea and vomiting, unspecified vomiting type  Diagnosis management comments: Presented to the ED with complaint of midepigastric abdominal pain, back pain, which started around midnight for her, she present with nausea vomiting, she was given IV fluids, Toradol, Zofran, is much more comfortable at this time, CT scan of the abdomen pelvis shows 1 stone within the gallbladder, but no other acute intra-abdominal pelvic process. Remaining labs are fairly unimpressive at this time. Total bili 0.3, alk phos 60 ALT 22, AST 21. Patient was discharged home with a prescription for Zofran, Bentyl, referral to her family provider next 2 to 3 days, she was also given information for general surgery should she have continued or ongoing pain secondary to gallbladder disease. She was also advised if she has worsening or change symptoms, return to the ED for reevaluation. Amount and/or Complexity of Data Reviewed  Clinical lab tests: reviewed        CRITICAL CARE TIME   Total Critical Care time was 0 minutes, excluding separately reportableprocedures. There was a high probability of clinicallysignificant/life threatening deterioration in the patient's condition which required my urgent intervention. CONSULTS:  None    PROCEDURES:  Unless otherwise noted below, none     Procedures    FINAL IMPRESSION      1. Left upper quadrant abdominal pain    2. Nausea and vomiting, unspecified vomiting type    3. Calculus of gallbladder without cholecystitis without obstruction          DISPOSITION/PLAN   DISPOSITION Decision To Discharge 10/16/2022 08:02:14 AM      PATIENT REFERRED TO:  Parth Arnold DO  [address]  [facility] [place] [address]  [phone]    In 3 days      Sabrina Hull MD  [address] [address] [place] [address]  [phone]    In 1 week      DISCHARGE MEDICATIONS:  New Prescriptions    DICYCLOMINE (BENTYL) 10 MG CAPSULE    Take 1 capsule by mouth every 6 hours as needed (cramps)    ONDANSETRON (ZOFRAN ODT) 4 MG DISINTEGRATING TABLET    Take 1 tablet by mouth every 8 hours as needed for Nausea          (Please note that portions of this note were completed with a voice recognition program.  Efforts were made to edit the dictations but occasionally words are mis-transcribed.)    Shantel Hsieh PA-C (electronically signed)  Attending Emergency Physician         Shantel Hsieh PA-C  10/16/22 3691       Jayda ASHWIN Howe  11/16/22 4199

## 2022-10-16 NOTE — ED NOTES
Pt sleeping upon entering room. Pt easily awakens. Pt denies pain at present. Skin pink w/d resp non labored. Gutierrez Man  Hilda Hayley  10/16/22 6182

## 2022-10-16 NOTE — ED NOTES
Patient presents to the er with complaints of abdominal pain and back pain   States that she thought she was constipated due to not having a BM for two days   Took ducolox at home and had a small BM at home  Patient became nauseous during triage and vomited x1  Patient reports feeling slightly better after vomiting  Afebrile  Resp even and unlabored        Chintan Irene RN  10/16/22 3222

## 2022-10-16 NOTE — Clinical Note
Jarod Waters was seen and treated in our emergency department on 10/16/2022. She may return to work on 10/18/2022. If you have any questions or concerns, please don't hesitate to call.       Christopher Berg PA-C

## 2022-11-16 ASSESSMENT — ENCOUNTER SYMPTOMS
ABDOMINAL DISTENTION: 0
EYE DISCHARGE: 0
ABDOMINAL PAIN: 1
SORE THROAT: 0
COLOR CHANGE: 0
RHINORRHEA: 0
CONSTIPATION: 0
SHORTNESS OF BREATH: 0
BACK PAIN: 1
NAUSEA: 1

## 2023-06-30 ENCOUNTER — APPOINTMENT (OUTPATIENT)
Dept: LAB | Facility: LAB | Age: 43
End: 2023-06-30
Payer: COMMERCIAL

## 2023-06-30 LAB
ALANINE AMINOTRANSFERASE (SGPT) (U/L) IN SER/PLAS: 23 U/L (ref 7–45)
ALBUMIN (G/DL) IN SER/PLAS: 4.5 G/DL (ref 3.4–5)
ALKALINE PHOSPHATASE (U/L) IN SER/PLAS: 55 U/L (ref 33–110)
ANION GAP IN SER/PLAS: 10 MMOL/L (ref 10–20)
ASPARTATE AMINOTRANSFERASE (SGOT) (U/L) IN SER/PLAS: 16 U/L (ref 9–39)
BILIRUBIN TOTAL (MG/DL) IN SER/PLAS: 0.8 MG/DL (ref 0–1.2)
CALCIDIOL (25 OH VITAMIN D3) (NG/ML) IN SER/PLAS: 32 NG/ML
CALCIUM (MG/DL) IN SER/PLAS: 9.7 MG/DL (ref 8.6–10.6)
CARBON DIOXIDE, TOTAL (MMOL/L) IN SER/PLAS: 26 MMOL/L (ref 21–32)
CHLORIDE (MMOL/L) IN SER/PLAS: 106 MMOL/L (ref 98–107)
CREATININE (MG/DL) IN SER/PLAS: 0.68 MG/DL (ref 0.5–1.05)
GFR FEMALE: >90 ML/MIN/1.73M2
GLUCOSE (MG/DL) IN SER/PLAS: 94 MG/DL (ref 74–99)
PARATHYRIN INTACT (PG/ML) IN SER/PLAS: 124.5 PG/ML (ref 18.5–88)
POTASSIUM (MMOL/L) IN SER/PLAS: 4 MMOL/L (ref 3.5–5.3)
PROTEIN TOTAL: 7.4 G/DL (ref 6.4–8.2)
SODIUM (MMOL/L) IN SER/PLAS: 138 MMOL/L (ref 136–145)
THYROTROPIN (MIU/L) IN SER/PLAS BY DETECTION LIMIT <= 0.05 MIU/L: 30.06 MIU/L (ref 0.44–3.98)
THYROXINE (T4) FREE (NG/DL) IN SER/PLAS: 1.31 NG/DL (ref 0.78–1.48)
UREA NITROGEN (MG/DL) IN SER/PLAS: 12 MG/DL (ref 6–23)

## 2023-07-03 LAB
THYROGLOBULIN AB (IU/ML) IN SER/PLAS: <0.9 IU/ML (ref 0–4)
THYROGLOBULIN LC-MS/MS: ABNORMAL NG/ML (ref 1.3–31.8)
THYROGLOBULIN: <0.1 NG/ML (ref 1.3–31.8)

## 2023-09-09 LAB
ALANINE AMINOTRANSFERASE (SGPT) (U/L) IN SER/PLAS: 27 U/L (ref 7–45)
ALBUMIN (G/DL) IN SER/PLAS: 4 G/DL (ref 3.4–5)
ALKALINE PHOSPHATASE (U/L) IN SER/PLAS: 49 U/L (ref 33–110)
ANION GAP IN SER/PLAS: 10 MMOL/L (ref 10–20)
ASPARTATE AMINOTRANSFERASE (SGOT) (U/L) IN SER/PLAS: 17 U/L (ref 9–39)
BILIRUBIN TOTAL (MG/DL) IN SER/PLAS: 0.7 MG/DL (ref 0–1.2)
CALCIUM (MG/DL) IN SER/PLAS: 9.3 MG/DL (ref 8.6–10.3)
CARBON DIOXIDE, TOTAL (MMOL/L) IN SER/PLAS: 26 MMOL/L (ref 21–32)
CHLORIDE (MMOL/L) IN SER/PLAS: 105 MMOL/L (ref 98–107)
CREATININE (MG/DL) IN SER/PLAS: 0.68 MG/DL (ref 0.5–1.05)
GFR FEMALE: >90 ML/MIN/1.73M2
GLUCOSE (MG/DL) IN SER/PLAS: 97 MG/DL (ref 74–99)
PARATHYRIN INTACT (PG/ML) IN SER/PLAS: 126.8 PG/ML (ref 18.5–88)
POTASSIUM (MMOL/L) IN SER/PLAS: 4 MMOL/L (ref 3.5–5.3)
PROTEIN TOTAL: 6.6 G/DL (ref 6.4–8.2)
SODIUM (MMOL/L) IN SER/PLAS: 137 MMOL/L (ref 136–145)
THYROTROPIN (MIU/L) IN SER/PLAS BY DETECTION LIMIT <= 0.05 MIU/L: 11.74 MIU/L (ref 0.44–3.98)
THYROXINE (T4) FREE (NG/DL) IN SER/PLAS: 0.89 NG/DL (ref 0.61–1.12)
UREA NITROGEN (MG/DL) IN SER/PLAS: 8 MG/DL (ref 6–23)

## 2023-09-11 LAB
CALCIUM (MG/DL) IN URINE: 20.8 MG/DL
CALCIUM (MG/L) IN 24 HOUR URINE: 135 MG/24H (ref 100–300)
COLLECTION DURATION OF URINE: 24 HR
CREATININE (MG/24HR) IN 24 HOUR URINE: 0.94 G/24H (ref 0.67–1.59)
CREATININE (MG/DL) IN URINE: 144 MG/DL (ref 20–320)
VOLUME OF URINE: 650 ML

## 2023-09-23 LAB
CALCIDIOL (25 OH VITAMIN D3) (NG/ML) IN SER/PLAS: 23 NG/ML
THYROTROPIN (MIU/L) IN SER/PLAS BY DETECTION LIMIT <= 0.05 MIU/L: 0.78 MIU/L (ref 0.44–3.98)

## 2023-12-28 ENCOUNTER — TELEPHONE (OUTPATIENT)
Dept: ENDOCRINOLOGY | Facility: HOSPITAL | Age: 43
End: 2023-12-28
Payer: COMMERCIAL

## 2023-12-28 NOTE — TELEPHONE ENCOUNTER
Left voicemail for patient to inform them of refill denial. Patient informed provider is no longer with practice and refill can not be granted at this time. Patient encouraged to reach out to pcp and number to central scheduling was given.

## 2024-02-19 PROBLEM — M25.529 ELBOW PAIN: Status: ACTIVE | Noted: 2024-02-19

## 2024-02-19 PROBLEM — R22.1 NECK FULLNESS: Status: ACTIVE | Noted: 2024-02-19

## 2024-02-19 PROBLEM — E89.0 STATUS POST TOTAL THYROIDECTOMY: Status: ACTIVE | Noted: 2024-02-19

## 2024-02-19 PROBLEM — L98.9 CHANGING SKIN LESION: Status: ACTIVE | Noted: 2024-02-19

## 2024-02-19 PROBLEM — E55.9 VITAMIN D DEFICIENCY: Status: ACTIVE | Noted: 2024-02-19

## 2024-02-19 PROBLEM — J01.90 ACUTE SINUSITIS: Status: ACTIVE | Noted: 2024-02-19

## 2024-02-19 PROBLEM — G43.009 MIGRAINE WITHOUT AURA AND WITHOUT STATUS MIGRAINOSUS, NOT INTRACTABLE: Status: ACTIVE | Noted: 2024-02-19

## 2024-02-19 PROBLEM — Z98.890 STATUS POST TOTAL THYROIDECTOMY: Status: ACTIVE | Noted: 2024-02-19

## 2024-02-19 PROBLEM — Z90.89 STATUS POST TOTAL THYROIDECTOMY: Status: ACTIVE | Noted: 2024-02-19

## 2024-02-19 PROBLEM — M54.2 NECK PAIN: Status: ACTIVE | Noted: 2024-02-19

## 2024-02-19 PROBLEM — M54.41 BILATERAL LOW BACK PAIN WITH RIGHT-SIDED SCIATICA: Status: ACTIVE | Noted: 2024-02-19

## 2024-02-19 PROBLEM — E03.9 HYPOTHYROIDISM: Status: ACTIVE | Noted: 2024-02-19

## 2024-02-19 PROBLEM — J30.2 SEASONAL ALLERGIES: Status: ACTIVE | Noted: 2024-02-19

## 2024-02-19 PROBLEM — L40.9 PSORIASIS: Status: ACTIVE | Noted: 2024-02-19

## 2024-02-19 PROBLEM — C73 PAPILLARY MICROCARCINOMA OF THYROID (MULTI): Status: ACTIVE | Noted: 2024-02-19

## 2024-02-19 PROBLEM — L30.9 ECZEMA: Status: ACTIVE | Noted: 2024-02-19

## 2024-02-19 PROBLEM — R53.83 FATIGUE: Status: ACTIVE | Noted: 2024-02-19

## 2024-02-19 RX ORDER — MOMETASONE FUROATE 1 MG/G
OINTMENT TOPICAL
COMMUNITY
Start: 2020-02-24

## 2024-02-19 RX ORDER — ERGOCALCIFEROL 1.25 MG/1
1 CAPSULE ORAL
COMMUNITY
End: 2024-03-12 | Stop reason: SDUPTHER

## 2024-02-19 RX ORDER — LEVOTHYROXINE SODIUM 200 UG/1
200 TABLET ORAL DAILY
COMMUNITY
Start: 2023-09-11 | End: 2024-03-12 | Stop reason: SDUPTHER

## 2024-03-12 ENCOUNTER — OFFICE VISIT (OUTPATIENT)
Dept: PRIMARY CARE | Facility: CLINIC | Age: 44
End: 2024-03-12
Payer: COMMERCIAL

## 2024-03-12 VITALS
SYSTOLIC BLOOD PRESSURE: 122 MMHG | WEIGHT: 199.4 LBS | DIASTOLIC BLOOD PRESSURE: 84 MMHG | BODY MASS INDEX: 33.22 KG/M2 | HEIGHT: 65 IN | OXYGEN SATURATION: 98 % | TEMPERATURE: 97.9 F | HEART RATE: 85 BPM

## 2024-03-12 DIAGNOSIS — E03.9 ACQUIRED HYPOTHYROIDISM: Primary | ICD-10-CM

## 2024-03-12 DIAGNOSIS — Z12.31 ENCOUNTER FOR SCREENING MAMMOGRAM FOR MALIGNANT NEOPLASM OF BREAST: ICD-10-CM

## 2024-03-12 DIAGNOSIS — E55.9 VITAMIN D DEFICIENCY: ICD-10-CM

## 2024-03-12 DIAGNOSIS — Z00.00 WELL ADULT EXAM: ICD-10-CM

## 2024-03-12 DIAGNOSIS — C73 PAPILLARY MICROCARCINOMA OF THYROID (MULTI): ICD-10-CM

## 2024-03-12 PROCEDURE — 99214 OFFICE O/P EST MOD 30 MIN: CPT | Performed by: FAMILY MEDICINE

## 2024-03-12 RX ORDER — LEVOTHYROXINE SODIUM 200 UG/1
200 TABLET ORAL DAILY
Qty: 90 TABLET | Refills: 1 | Status: SHIPPED | OUTPATIENT
Start: 2024-03-12

## 2024-03-12 RX ORDER — ERGOCALCIFEROL 1.25 MG/1
CAPSULE ORAL
Qty: 13 CAPSULE | Refills: 1 | Status: SHIPPED | OUTPATIENT
Start: 2024-03-12

## 2024-03-12 ASSESSMENT — PATIENT HEALTH QUESTIONNAIRE - PHQ9
2. FEELING DOWN, DEPRESSED OR HOPELESS: NOT AT ALL
SUM OF ALL RESPONSES TO PHQ9 QUESTIONS 1 AND 2: 0
1. LITTLE INTEREST OR PLEASURE IN DOING THINGS: NOT AT ALL

## 2024-03-12 NOTE — PROGRESS NOTES
"Subjective   Patient ID: Diane Rene is a 43 y.o. female who presents for New Patient Visit.    HPI     Here to Re-establish care.  Last seen over 3 years ago.    She has hypothyroidism.  Has been treated by endocrinology.  Concerned about her thyroid as she will not be able to see endocrinology until May   Currently on 200 mcg of levothyroxine for her hypothyroidism.    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  Her last endocrinology visit was 6/30/2023 and reveals the following:  She also has MNG with dominant nodule 1.2 cm on the right Has been biopsied twice in 4/18 and 6/18, both pathologies show FLUS requested referral to surgery s/p thyroidectomy, with incidental micro PTC 0.6 cm no invasion .her Post op Tg was <0.2, she opted not to have MCMANUS which is c/w MITCHEL recommendation , we will aim to keep TSH 0<2 , we will check USG very 6 months and TFT today labs ordered [facility] ordered clinidally euithyroid, alst visit was >1.5 years ago , no acute issues, follow up in 6 months       Review of Systems  Constitutional: Patient denies any fever, chills, loss of appetite, or unexplained weight loss.  Cardiovascular: Patient denies any chest pain, shortness of breath with exertion, tachycardia, palpitations, orthopnea, or paroxysmal nocturnal dyspnea.  Respiratory: Patient denies any cough, shortness breath, or wheezing.  Gastrointestinal: Patient denies any nausea, vomiting, diarrhea, constipation, melena, hematochezia, or reflux symptoms.  Skin: Denies any rashes or skin lesions.   Neurology: Patient denies any new motor or sensory losses.  Denies any numbness, tingling, weakness, and incoordination of the extremities.  Patient also denies any tremor, seizures, or gait instability.  Endocrinology: Denies any polyuria, polydipsia, polyphagia, or heat/cold intolerance.    ROS is otherwise negative if not noted.      Objective   /84   Pulse 85   Temp 36.6 °C (97.9 °F)   Ht 1.651 m (5' 5\")  "  Wt 90.4 kg (199 lb 6.4 oz)   SpO2 98%   BMI 33.18 kg/m²     Physical Exam  General Appearance: Alert and cooperative, in no acute distress, well-developed/well-nourished.  Neck: Supple and without adenopathy or rigidity.  There is no JVD at 90° and no carotid bruits are noted.  There is no thyromegaly, thyroid tenderness, or palpable thyroid nodules.  Heart: Regular rate and rhythm without murmur or ectopy.  Respiratory: Lungs are clear to auscultation bilaterally with good air exchange.  Good respiratory effort and no accessory muscle use.  Skin: Good turgor, moist, warm and without rashes or lesions.  Neurological exam: Alert and oriented ×3, no tremor, normal gait.  Extremities: No clubbing, cyanosis, or edema        Assessment/Plan   1. Acquired hypothyroidism  We will continue the current dose of levothyroxine and refer to endocrinology for long-term management.  - levothyroxine (Synthroid, Levoxyl) 200 mcg tablet; Take 1 tablet (200 mcg) by mouth once daily.  Dispense: 90 tablet; Refill: 1  - Referral to Endocrinology; Future  - TSH with reflex to Free T4 if abnormal; Future    2. Vitamin D deficiency  She will continue the current vitamin D supplementation and we will continue to monitor.  - ergocalciferol (Vitamin D-2) 1.25 MG (90827 UT) capsule; Take 1 capsule weekly  Dispense: 13 capsule; Refill: 1  - Vitamin D 25-Hydroxy,Total (for eval of Vitamin D levels); Future    3. Papillary microcarcinoma of thyroid (CMS/HCC)  Patient has been referred to endocrinology for long-term management.  - Referral to Endocrinology; Future    4. Encounter for screening mammogram for malignant neoplasm of breast  Screening mammogram has been ordered.  - BI mammo bilateral screening tomosynthesis; Future    5. Well adult exam  Patient will follow-up in 6 months for a well exam.  Routine labs were ordered  - Comprehensive Metabolic Panel; Future  - Lipid Panel; Future        Orders Placed This Encounter   Procedures    BI  mammo bilateral screening tomosynthesis    TSH with reflex to Free T4 if abnormal    Vitamin D 25-Hydroxy,Total (for eval of Vitamin D levels)    Comprehensive Metabolic Panel    Lipid Panel    Referral to Endocrinology     Requested Prescriptions     Signed Prescriptions Disp Refills    levothyroxine (Synthroid, Levoxyl) 200 mcg tablet 90 tablet 1     Sig: Take 1 tablet (200 mcg) by mouth once daily.    ergocalciferol (Vitamin D-2) 1.25 MG (57965 UT) capsule 13 capsule 1     Sig: Take 1 capsule weekly

## 2024-03-12 NOTE — PATIENT INSTRUCTIONS
Our new address will be:  [address], [place]    Follow up in 6 months with labs to be done PRIOR.    It was a pleasure to see you today. Thank you for choosing us for your health care needs.    If you have lab or other testing completed and have not been informed of results within one week, please call the office for your results.    If you haven't done so, consider signing up for [facility] [employer], the [facility] personal health record. Ask the staff how you can get started.

## 2024-03-16 ENCOUNTER — HOSPITAL ENCOUNTER (OUTPATIENT)
Dept: RADIOLOGY | Facility: HOSPITAL | Age: 44
Discharge: HOME | End: 2024-03-16
Payer: COMMERCIAL

## 2024-03-16 VITALS — BODY MASS INDEX: 32.82 KG/M2 | HEIGHT: 65 IN | WEIGHT: 197 LBS

## 2024-03-16 DIAGNOSIS — Z12.31 ENCOUNTER FOR SCREENING MAMMOGRAM FOR MALIGNANT NEOPLASM OF BREAST: ICD-10-CM

## 2024-03-16 PROCEDURE — 77067 SCR MAMMO BI INCL CAD: CPT | Performed by: RADIOLOGY

## 2024-03-16 PROCEDURE — 77063 BREAST TOMOSYNTHESIS BI: CPT | Performed by: RADIOLOGY

## 2024-03-16 PROCEDURE — 77067 SCR MAMMO BI INCL CAD: CPT

## 2024-05-08 ENCOUNTER — OFFICE VISIT (OUTPATIENT)
Dept: ENDOCRINOLOGY | Facility: CLINIC | Age: 44
End: 2024-05-08
Payer: COMMERCIAL

## 2024-05-08 DIAGNOSIS — C73 PAPILLARY MICROCARCINOMA OF THYROID (MULTI): ICD-10-CM

## 2024-05-08 DIAGNOSIS — E89.0 POSTOPERATIVE HYPOTHYROIDISM: Primary | ICD-10-CM

## 2024-05-08 DIAGNOSIS — E03.9 ACQUIRED HYPOTHYROIDISM: ICD-10-CM

## 2024-05-08 PROCEDURE — 99214 OFFICE O/P EST MOD 30 MIN: CPT | Performed by: NURSE PRACTITIONER

## 2024-05-08 PROCEDURE — 1036F TOBACCO NON-USER: CPT | Performed by: NURSE PRACTITIONER

## 2024-05-08 NOTE — PATIENT INSTRUCTIONS
Get thyroid labs     Continue levothyroxine 200 mcg once daily except 1.5 tablets on Sundays     Schedule next appt with Dr. Davis 6 months downtown.

## 2024-05-08 NOTE — PROGRESS NOTES
"Subjective   Diane Rene is a 43 y.o. female presents today for a follow up visit  regarding hypothyroidism and papillary thyroid carcinoma.  The patient was initially diagnosed > 10 years prior to thyroidectomy.      Patient of Dr. Quintero and last seen by her 1/2023  This is the first visit with me   Had pre existing hypothyroidism prior to thyroidectomy   Has undetermined thyroid biopsy in 2018 which led to her thyroid being removed   s/p total thyroidectomy in 2019  Incidental micro PTC 0.6 cm with no vascular invasion- no MCMANUS     The patient is currently taking levothyroxine 200 mcg once daily except 1.5 tablets on Sundays    Takes appropriately     ROS  Thyroid pain: no  Mass effect: some difficulty swallowing, denies change in voice, difficulty breathing, or pressure at neck   Energy: stable   Sleep: normal  Temperature Intolerance: denies heat intolerance, cold intolerance   GYN: regular  Cardiovascular:  denies heart palpitations or chest pain  GI: denies loose stools, frequent stools or constipation  Weight: stable    Diaphoresis: denies diaphoresis, or hot flashes  Skin: no changes to skin, hair, nails  Neuro: negative headaches, seizures, tremors.  She does get intermittent migraines      Objective    Physical Exam  Blood pressure 108/70, pulse 83, height 1.651 m (5' 5\"), weight 90.9 kg (200 lb 6.4 oz).  General: not in acute distress, cooperative  Thyroid:  thyroid not palpable    Respiratory: normal respiratory effort  Musculoskeletal: normal gait        Current Outpatient Medications:     ergocalciferol (Vitamin D-2) 1.25 MG (27223 UT) capsule, Take 1 capsule weekly, Disp: 13 capsule, Rfl: 1    levothyroxine (Synthroid, Levoxyl) 200 mcg tablet, Take 1 tablet (200 mcg) by mouth once daily., Disp: 90 tablet, Rfl: 1    mometasone (Elocon) 0.1 % ointment, APPLY SPARINGLY TO AFFECTED AREA(S) ONCE DAILY MONDAY THROUGH FRIDAY, Disp: , Rfl:           Assessment/Plan   Postoperative hypothyroidism  History " of papillary thyroid cancer:   - Clinically and biochemically euthyroid.  She is taking her meds appropriately.  She denies missing doses routinely and if she does she will take later that day.  Thyroglobulin has been undetectable since surgery.  Given history of thyroid cancer recommended she follow-up with staff endocrinologist.  [facility] location works better for [place] and [place] her care.  [facility]    Plan:   Get thyroid labs   Continue levothyroxine 200 mcg once daily except 1.5 tablets on Sundays   Schedule next appt with Dr. Davis 6 months downw.

## 2024-05-09 VITALS
DIASTOLIC BLOOD PRESSURE: 70 MMHG | HEIGHT: 65 IN | SYSTOLIC BLOOD PRESSURE: 108 MMHG | HEART RATE: 83 BPM | WEIGHT: 200.4 LBS | BODY MASS INDEX: 33.39 KG/M2

## 2024-09-12 ENCOUNTER — APPOINTMENT (OUTPATIENT)
Dept: PRIMARY CARE | Facility: CLINIC | Age: 44
End: 2024-09-12
Payer: COMMERCIAL

## 2024-09-12 VITALS
OXYGEN SATURATION: 98 % | HEIGHT: 65 IN | TEMPERATURE: 98.1 F | SYSTOLIC BLOOD PRESSURE: 112 MMHG | HEART RATE: 72 BPM | DIASTOLIC BLOOD PRESSURE: 77 MMHG | BODY MASS INDEX: 34.04 KG/M2 | WEIGHT: 204.3 LBS

## 2024-09-12 DIAGNOSIS — E55.9 VITAMIN D DEFICIENCY: ICD-10-CM

## 2024-09-12 DIAGNOSIS — Z12.11 ENCOUNTER FOR SCREENING FOR MALIGNANT NEOPLASM OF COLON: ICD-10-CM

## 2024-09-12 DIAGNOSIS — C73 PAPILLARY MICROCARCINOMA OF THYROID (MULTI): ICD-10-CM

## 2024-09-12 DIAGNOSIS — E03.9 ACQUIRED HYPOTHYROIDISM: ICD-10-CM

## 2024-09-12 DIAGNOSIS — Z00.00 WELL ADULT EXAM: Primary | ICD-10-CM

## 2024-09-12 PROCEDURE — 3008F BODY MASS INDEX DOCD: CPT | Performed by: FAMILY MEDICINE

## 2024-09-12 PROCEDURE — 99396 PREV VISIT EST AGE 40-64: CPT | Performed by: FAMILY MEDICINE

## 2024-09-12 NOTE — PROGRESS NOTES
"Subjective   Patient ID: Diane Rene is a 44 y.o. female who presents for follow up and annual well exam.    HPI     No new concerns   No recent BW        She has hypothyroidism.  Has been followed by endocrinology as well.  Patient reports compliance with the dosing of her levothyroxine.  Denies any heat or cold intolerance as well as any unexpected weight changes.    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  Underwent thyroidectomy.  Was following with nurse practitioner who has recommended following with endocrinologist.  Pt to see Dr. Davis 11/8/2024.      Review of Systems  Constitutional: Patient denies any fever, chills, loss of appetite, or unexplained weight loss.  HEENT: Denies any headache, sore throat, eye pain, ear pain, decreased vision, or decreased hearing.  Patient also denies any rhinorrhea.  Cardiovascular: Patient denies any chest pain, shortness of breath with exertion, tachycardia, palpitations, orthopnea, or paroxysmal nocturnal dyspnea.  Respiratory: Patient denies any cough, shortness breath, or wheezing.  Gastrointestinal: Patient denies any nausea, vomiting, diarrhea, constipation, melena, hematochezia, or reflux symptoms.  Musculoskeletal: Patient denies any myalgia, arthralgia, joint swelling, or joint deformity   Skin: Denies any rashes or skin lesions.   Neurology: Patient denies any new motor or sensory losses.  Denies any numbness, tingling, weakness, and incoordination of the extremities.  Patient also denies any tremor, seizures, or gait instability.  Endocrinology: Denies any polyuria, polydipsia, polyphagia, or heat/cold intolerance.  Psychiatric: Denies any anxiety, depression, or suicidal/homicidal ideation.  Hematology: Patient denies any abnormal bruising or bleeding.      Objective   /77   Pulse 72   Temp 36.7 °C (98.1 °F) (Temporal)   Ht 1.651 m (5' 5\")   Wt 92.7 kg (204 lb 4.8 oz)   SpO2 98%   BMI 34.00 kg/m² "     Physical Exam  Gen. appearance: Alert and cooperative, no acute distress, well-developed/well-nourished.  Head: Normocephalic and atraumatic.  EENT: Pupils are equal round reactive to light, extraocular muscles are intact, mucous membranes are moist, external auditory canals and tympanic membranes are within normal limits bilaterally, pharynx is without erythema or exudate, there is no noted rhinorrhea.  Neck: Supple and without adenopathy, no JVD at 90° and no carotid bruits are noted.  There is no thyromegaly, thyroid tenderness, or palpable thyroid nodules.  Cardiovascular: Regular rate and rhythm without murmur or ectopy.  Respiratory: Lungs are clear to auscultation bilaterally with good air exchange.  Good respiratory effort and no accessory muscle use.  Abdomen: Soft, nontender/nondistended.  No masses, guarding, rebound, or rigidity.  No hepatosplenomegaly, abdominal bruits, or CVA tenderness.  Bowel sounds are normal.  There is no widening of the aortic pulsation.  Musculoskeletal: Patient has good range of motion of the shoulders, elbows, wrists, hips, knees.  There are no noted joint effusions or deformities.  Skin: Good turgor, moist, warm and without rashes or lesions.  Lymph nodes: No cervical, clavicular, or inguinal adenopathy.  Neurological exam: Alert and oriented ×3, no tremor, normal gait.  Psychiatric: Appropriate mood and affect, good insight and judgment, no delusions or thought disorders, no suicidal or homicidal ideation.  Extremities: No clubbing, cyanosis, or edema        Assessment/Plan     Well Exam   Appropriate screenings for the patient's current age were discussed at length.  I encouraged a low-fat/low-cholesterol diet and routine exercise.  Referred for screening colonoscopy as she is now 45 y/o.  Influenza vaccine declined.      Acquired hypothyroidism  We will continue the current dose of levothyroxine.  Pt will be establishing with endocrinologist 11/2024    Vitamin D  deficiency  She will continue the current vitamin D supplementation and we will continue to monitor.    Papillary microcarcinoma of thyroid (CMS/HCC)  Patient previously referred to endocrinology for long-term management.  Was seen by nurse practitioner 5/8/2024 and referred to staff endocrinologist.  Will be seen 11/2024.         Orders Placed This Encounter   Procedures    Vitamin D 25-Hydroxy,Total (for eval of Vitamin D levels)     Results for orders placed or performed in visit on 09/23/23   TSH with reflex to Free T4 if abnormal   Result Value Ref Range    TSH 0.78 0.44 - 3.98 mIU/L   Vitamin D 25-Hydroxy,Total (for eval of Vitamin D levels)   Result Value Ref Range    Vitamin D, 25-Hydroxy 23 (A) ng/mL

## 2024-09-12 NOTE — PATIENT INSTRUCTIONS
Follow up in 1 year and as needed.    It was a pleasure to see you today. Thank you for choosing us for your health care needs.    If you have lab or other testing completed and have not been informed of results within one week, please call the office for your results.    If you haven't done so, consider signing up for [facility] [employer], the [facility] personal health record. Ask the staff how you can get started.

## 2024-09-28 ENCOUNTER — LAB (OUTPATIENT)
Dept: LAB | Facility: LAB | Age: 44
End: 2024-09-28
Payer: COMMERCIAL

## 2024-09-28 DIAGNOSIS — Z00.00 WELL ADULT EXAM: ICD-10-CM

## 2024-09-28 DIAGNOSIS — E55.9 VITAMIN D DEFICIENCY: ICD-10-CM

## 2024-09-28 LAB
25(OH)D3 SERPL-MCNC: 16 NG/ML (ref 30–100)
ALBUMIN SERPL BCP-MCNC: 4 G/DL (ref 3.4–5)
ALP SERPL-CCNC: 55 U/L (ref 33–110)
ALT SERPL W P-5'-P-CCNC: 18 U/L (ref 7–45)
ANION GAP SERPL CALC-SCNC: 8 MMOL/L (ref 10–20)
AST SERPL W P-5'-P-CCNC: 13 U/L (ref 9–39)
BILIRUB SERPL-MCNC: 0.8 MG/DL (ref 0–1.2)
BUN SERPL-MCNC: 12 MG/DL (ref 6–23)
CALCIUM SERPL-MCNC: 9.5 MG/DL (ref 8.6–10.3)
CHLORIDE SERPL-SCNC: 107 MMOL/L (ref 98–107)
CHOLEST SERPL-MCNC: 185 MG/DL (ref 0–199)
CHOLESTEROL/HDL RATIO: 4.8
CO2 SERPL-SCNC: 28 MMOL/L (ref 21–32)
CREAT SERPL-MCNC: 0.7 MG/DL (ref 0.5–1.05)
EGFRCR SERPLBLD CKD-EPI 2021: >90 ML/MIN/1.73M*2
GLUCOSE SERPL-MCNC: 97 MG/DL (ref 74–99)
HDLC SERPL-MCNC: 38.3 MG/DL
LDLC SERPL CALC-MCNC: 99 MG/DL
NON HDL CHOLESTEROL: 147 MG/DL (ref 0–149)
POTASSIUM SERPL-SCNC: 4.4 MMOL/L (ref 3.5–5.3)
PROT SERPL-MCNC: 6.4 G/DL (ref 6.4–8.2)
SODIUM SERPL-SCNC: 139 MMOL/L (ref 136–145)
TRIGL SERPL-MCNC: 238 MG/DL (ref 0–149)
VLDL: 48 MG/DL (ref 0–40)

## 2024-09-28 PROCEDURE — 36415 COLL VENOUS BLD VENIPUNCTURE: CPT

## 2024-09-28 PROCEDURE — 82306 VITAMIN D 25 HYDROXY: CPT

## 2024-09-28 PROCEDURE — 80053 COMPREHEN METABOLIC PANEL: CPT

## 2024-09-28 PROCEDURE — 80061 LIPID PANEL: CPT

## 2024-10-22 DIAGNOSIS — E55.9 VITAMIN D DEFICIENCY: ICD-10-CM

## 2024-10-22 RX ORDER — ERGOCALCIFEROL 1.25 MG/1
CAPSULE ORAL
Qty: 13 CAPSULE | Refills: 1 | Status: SHIPPED | OUTPATIENT
Start: 2024-10-22

## 2024-11-06 NOTE — PROGRESS NOTES
"11/8/2024     Subjective   Diane Rene is a 44 y.o. female presents today for a follow up visit  regarding hypothyroidism and papillary thyroid carcinoma.      Previously seen by Dr. Quintero. Saw rKistin Goodman 5/2024.     History:   Had pre existing hypothyroidism prior to thyroidectomy   Has thyroid biopsy in 2018 which led to her thyroid being removed   s/p total thyroidectomy in 2019 (Dr. Perrin)   Incidental micro PTC 0.6 cm with no vascular invasion- no MCMANUS     Has had yearly neck ultrasounds for the last 4 years which have showed RAMEZ.   Her Tg levels have been undetectable (TSH up to 76).     The patient is currently taking levothyroxine 200 mcg once daily except 1.5 tablets on Sundays. Total weekly dose is 7.5 tabs.    She has been on this dose for the last 1 year.   Usually takes it appropriately, but sometimes forget and ends up taking it later in the day.     Today she is feeling well.   No complaints.   No neck symptoms     SocHx:  - Denies alcohol use   - Denies tobacco use   - Denies recreational drug use   - Works in accounts receivable for an [employer]   - Lives with  and 2 kids - son 19, daughter 14     Objective    Physical Exam  /85   Pulse 91   Ht 1.651 m (5' 5\")   Wt 92.1 kg (203 lb)   BMI 33.78 kg/m²   General: Well appearing, no acute distress  Heart: Normal rate, regular rhythm   Neck: Soft, nontender, no lymphadenopathy, well healed surgical scar s/p total thyroidectomy   Lungs: Breathing comfortably on room air   Extremities: Warm, no edema  Skin: No rashes      Current Outpatient Medications on File Prior to Visit   Medication Sig Dispense Refill    ergocalciferol (Vitamin D-2) 1.25 MG (28643 UT) capsule Take 1 capsule weekly 13 capsule 1    levothyroxine (Synthroid, Levoxyl) 200 mcg tablet Take 1 tablet (200 mcg) by mouth once daily. 90 tablet 1    mometasone (Elocon) 0.1 % ointment APPLY SPARINGLY TO AFFECTED AREA(S) ONCE DAILY MONDAY THROUGH FRIDAY       No " current facility-administered medications on file prior to visit.      Anti-Thyroglobulin AB Thyroglobulin Thyroglobulin LC-MS/MS   Latest Ref Rng 0.0 - 4.0 IU/mL 1.3 - 31.8 ng/mL 1.3 - 31.8 ng/mL   10/8/2021 <0.9  <0.1 (L)  Not Applicable    6/30/2023 <0.9  <0.1 (L)  Not Applicable    9/9/2023 <0.9  <0.1 (L)  Not Applicable    9/23/2023         Thyroid Stimulating Hormone   Latest Ref Rng 0.44 - 3.98 mIU/L   10/8/2021 76.18 (H)    6/30/2023 30.06 (H)    9/9/2023 11.74 (H)    9/23/2023 0.78       Legend:  (L) Low  (H) High  (L) Low    US NECK MASS;  7/17/2023 8:05 am     INDICATION:  f/u  E03.9: Hypothyroidism E55.9: Vitamin D deficiency C73: Papillary  microcarcinoma of thyroid.     COMPARISON:  Thyroid ultrasound 01/22/2020     FINDINGS:  Status post thyroidectomy. No mass within the thyroidectomy bed.  Multiple bilateral cervical lymph nodes which have varying degrees of  fatty hilum largest on the right at level 1 B measures 8 x 5 x 8 mm  and the largest on the left at level 3 measures 11 x 3 x 4 mm.      Impression   1. Status post thyroidectomy without suspicious mass within the  thyroidectomy bed.  2. Multiple bilateral subcentimeter benign-appearing cervical lymph  nodes.          Assessment/Plan   Papillary thyroid microcarcinoma s/p total thyroidectomy now with post-surgical hypothyroidism:  Thyroglobulin has been undetectable since surgery, and ultrasounds have RAMEZ. Discussed that she is low risk for recurrence    Plan:   TSH, Tg   TSH goal 0.5-2.0   Neck ultrasound 2025   Continue levothyroxine 200 mcg once daily except 1.5 tablets on Sundays     Follow up in 1 year   Glendy Davis DO

## 2024-11-08 ENCOUNTER — APPOINTMENT (OUTPATIENT)
Dept: ENDOCRINOLOGY | Facility: CLINIC | Age: 44
End: 2024-11-08
Payer: COMMERCIAL

## 2024-11-08 VITALS
WEIGHT: 203 LBS | BODY MASS INDEX: 33.82 KG/M2 | SYSTOLIC BLOOD PRESSURE: 118 MMHG | DIASTOLIC BLOOD PRESSURE: 85 MMHG | HEIGHT: 65 IN | HEART RATE: 91 BPM

## 2024-11-08 DIAGNOSIS — C73 PAPILLARY MICROCARCINOMA OF THYROID (MULTI): Primary | ICD-10-CM

## 2024-11-08 DIAGNOSIS — E03.9 ACQUIRED HYPOTHYROIDISM: ICD-10-CM

## 2024-11-08 PROCEDURE — 3008F BODY MASS INDEX DOCD: CPT | Performed by: STUDENT IN AN ORGANIZED HEALTH CARE EDUCATION/TRAINING PROGRAM

## 2024-11-08 PROCEDURE — 99214 OFFICE O/P EST MOD 30 MIN: CPT | Performed by: STUDENT IN AN ORGANIZED HEALTH CARE EDUCATION/TRAINING PROGRAM

## 2024-11-08 PROCEDURE — G2211 COMPLEX E/M VISIT ADD ON: HCPCS | Performed by: STUDENT IN AN ORGANIZED HEALTH CARE EDUCATION/TRAINING PROGRAM

## 2024-11-08 PROCEDURE — 1036F TOBACCO NON-USER: CPT | Performed by: STUDENT IN AN ORGANIZED HEALTH CARE EDUCATION/TRAINING PROGRAM

## 2024-11-08 RX ORDER — LEVOTHYROXINE SODIUM 200 UG/1
TABLET ORAL
COMMUNITY
Start: 2024-11-08

## 2024-11-08 ASSESSMENT — PATIENT HEALTH QUESTIONNAIRE - PHQ9
1. LITTLE INTEREST OR PLEASURE IN DOING THINGS: NOT AT ALL
2. FEELING DOWN, DEPRESSED OR HOPELESS: SEVERAL DAYS
10. IF YOU CHECKED OFF ANY PROBLEMS, HOW DIFFICULT HAVE THESE PROBLEMS MADE IT FOR YOU TO DO YOUR WORK, TAKE CARE OF THINGS AT HOME, OR GET ALONG WITH OTHER PEOPLE: NOT DIFFICULT AT ALL
SUM OF ALL RESPONSES TO PHQ9 QUESTIONS 1 & 2: 1

## 2025-02-09 LAB
THYROGLOB AB SERPL-ACNC: <1 IU/ML
THYROGLOB SERPL-MCNC: <0.1 NG/ML
TSH SERPL-ACNC: 0.21 MIU/L

## 2025-02-10 DIAGNOSIS — E89.0 POSTOPERATIVE HYPOTHYROIDISM: Primary | ICD-10-CM

## 2025-02-14 DIAGNOSIS — E03.9 ACQUIRED HYPOTHYROIDISM: ICD-10-CM

## 2025-02-14 RX ORDER — LEVOTHYROXINE SODIUM 200 UG/1
TABLET ORAL
Qty: 100 TABLET | Refills: 2 | Status: SHIPPED | OUTPATIENT
Start: 2025-02-14

## 2025-03-09 ENCOUNTER — OFFICE VISIT (OUTPATIENT)
Dept: URGENT CARE | Age: 45
End: 2025-03-09
Payer: COMMERCIAL

## 2025-03-09 ENCOUNTER — ANCILLARY PROCEDURE (OUTPATIENT)
Dept: URGENT CARE | Age: 45
End: 2025-03-09
Payer: COMMERCIAL

## 2025-03-09 VITALS
BODY MASS INDEX: 34.16 KG/M2 | TEMPERATURE: 98 F | RESPIRATION RATE: 18 BRPM | DIASTOLIC BLOOD PRESSURE: 76 MMHG | SYSTOLIC BLOOD PRESSURE: 134 MMHG | HEART RATE: 81 BPM | HEIGHT: 65 IN | WEIGHT: 205 LBS | OXYGEN SATURATION: 96 %

## 2025-03-09 DIAGNOSIS — M25.561 ACUTE PAIN OF RIGHT KNEE: Primary | ICD-10-CM

## 2025-03-09 DIAGNOSIS — M25.561 ACUTE PAIN OF RIGHT KNEE: ICD-10-CM

## 2025-03-09 PROCEDURE — 73562 X-RAY EXAM OF KNEE 3: CPT | Mod: RIGHT SIDE | Performed by: NURSE PRACTITIONER

## 2025-03-09 PROCEDURE — 99203 OFFICE O/P NEW LOW 30 MIN: CPT | Performed by: NURSE PRACTITIONER

## 2025-03-09 PROCEDURE — 3008F BODY MASS INDEX DOCD: CPT | Performed by: NURSE PRACTITIONER

## 2025-03-09 ASSESSMENT — ENCOUNTER SYMPTOMS
CARDIOVASCULAR NEGATIVE: 1
ARTHRALGIAS: 1
CONSTITUTIONAL NEGATIVE: 1
PSYCHIATRIC NEGATIVE: 1
GASTROINTESTINAL NEGATIVE: 1
RESPIRATORY NEGATIVE: 1

## 2025-03-09 NOTE — Clinical Note
March 9, 2025     Patient: Diane Rene   YOB: 1980   Date of Visit: 3/9/2025       To Whom It May Concern:    It is my medical opinion that Diane Rene {Work release (duty restriction):87504}.    If you have any questions or concerns, please don't hesitate to call.         Sincerely,        Pascale St, OBDULIO-CNP    CC: No Recipients

## 2025-03-09 NOTE — LETTER
March 9, 2025     Patient: Diane Rene   YOB: 1980   Date of Visit: 3/9/2025       To Whom It May Concern:    Diane Rene was seen in my clinic on 3/9/2025 at 11:15 am. Please excuse Diane for her absence from work on this day to make the appointment.    If you have any questions or concerns, please don't hesitate to call.         Sincerely,         Pascale St, OBDULIO-CNP        CC: No Recipients

## 2025-03-09 NOTE — PROGRESS NOTES
"Subjective   Patient ID: Diane Rene is a 44 y.o. female. They present today with a chief complaint of Knee Injury (Right lateral knee injury, slipped on ice yesterday. Pt slipped then twisted before falling on it. ).    History of Present Illness  44 year old female presents for right knee pain after fall yesterday. Patient has injured knee 3x in the last few weeks. She is complaining of lateral knee pain with movement.           Past Medical History  Allergies as of 03/09/2025    (No Known Allergies)       (Not in a hospital admission)       Past Medical History:   Diagnosis Date    Dermatitis, unspecified 08/26/2015    Eczema    Hypothyroidism, unspecified 10/08/2021    Hypothyroidism       Past Surgical History:   Procedure Laterality Date    OTHER SURGICAL HISTORY  04/01/2020    Thyroidectomy total    OTHER SURGICAL HISTORY  04/22/2016    Prior Surgical Procedure Not Done        reports that she quit smoking about 20 years ago. Her smoking use included cigarettes. She started smoking about 25 years ago. She has a 2.5 pack-year smoking history. She has never used smokeless tobacco. She reports that she does not currently use alcohol. She reports that she does not use drugs.    Review of Systems  Review of Systems   Constitutional: Negative.    HENT: Negative.     Respiratory: Negative.     Cardiovascular: Negative.    Gastrointestinal: Negative.    Genitourinary: Negative.    Musculoskeletal:  Positive for arthralgias and gait problem.   Psychiatric/Behavioral: Negative.                                    Objective    Vitals:    03/09/25 1030   BP: 134/76   Pulse: 81   Resp: 18   Temp: 36.7 °C (98 °F)   TempSrc: Temporal   SpO2: 96%   Weight: 93 kg (205 lb)   Height: 1.651 m (5' 5\")     No LMP recorded.    Physical Exam  Vitals reviewed.   Constitutional:       Appearance: Normal appearance.   Musculoskeletal:         General: Tenderness and signs of injury present.   Skin:     General: Skin is warm and dry. "   Neurological:      General: No focal deficit present.      Mental Status: She is alert and oriented to person, place, and time. Mental status is at baseline.         Procedures    Point of Care Test & Imaging Results from this visit  No results found for this visit on 03/09/25.   XR knee right 3 views    Result Date: 3/9/2025  Interpreted By:  Luis Mejia, STUDY: Right knee dated  3/9/2025.   INDICATION: Signs/Symptoms:fall knee pain   COMPARISON: None.   ACCESSION NUMBER(S): [id number]   ORDERING CLINICIAN: PORSHA GAMBOA   TECHNIQUE: Four views of the right knee.   FINDINGS: No fracture or dislocation is evident.  No knee effusion is evident. No soft tissue gas or radiopaque foreign body is evident.       No osseous injury is evident.   MACRO: None   Signed by: Luis Mejia 3/9/2025 11:18 AM Dictation workstation:   [id number]     Diagnostic study results (if any) were reviewed by THEODORA Landrum.    Assessment/Plan   Allergies, medications, history, and pertinent labs/EKGs/Imaging reviewed by THEODORA Landrum.     Medical Decision Making  SUBJECTIVE:  Diane Rene is a 44 y.o. female who sustained a right knee injury. Mechanism of injury: fall. Immediate symptoms: immediate pain. Symptoms have been constant since that time.     OBJECTIVE:  Vital signs as noted above.  Appearance: alert, well appearing, and in no distress.  Knee exam: soft tissue tenderness over lateral aspect of right knee .  X-ray: No fracture or dislocation is evident.  No knee effusion is evident. No soft tissue gas or radiopaque foreign body is evident. No osseous injury is evident.    ASSESSMENT:  Knee strain    PLAN:  Exam consistent with knee staring. rest the injured area as much as practical.   Plan follow up as discussed with orthopedics. Reinforced red flags including (but not limited to): severe or worsening pain; difficulty swallowing; stiff neck; shortness of breath; coughing or vomiting blood;  chest pain; and new or increased fever are indications to go to the Emergency Department.  At time of discharge patient was clinically well-appearing and HDS for outpatient management. The patient and/or family was educated regarding diagnosis, supportive care, OTC and Rx medications. The patient and/or family was given the opportunity to ask questions prior to discharge.  They verbalized understanding of my discussion of the plans for treatment, expected course, indications to return to  or seek further evaluation in ED, and the need for timely follow up as directed.   T All questions were answered and the patient verbalized understanding of the plan of care for today.             THEODORA Landrum      See orders for this visit as documented in the electronic medical record.     Orders and Diagnoses  Diagnoses and all orders for this visit:  Acute pain of right knee  -     XR knee right 3 views; Future      Medical Admin Record      Patient disposition: Home    Electronically signed by THEODORA Landrum  12:12 PM

## 2025-04-07 DIAGNOSIS — E89.0 POSTOPERATIVE HYPOTHYROIDISM: ICD-10-CM

## 2025-09-16 ENCOUNTER — APPOINTMENT (OUTPATIENT)
Dept: PRIMARY CARE | Facility: CLINIC | Age: 45
End: 2025-09-16
Payer: COMMERCIAL

## 2025-11-14 ENCOUNTER — APPOINTMENT (OUTPATIENT)
Dept: ENDOCRINOLOGY | Facility: CLINIC | Age: 45
End: 2025-11-14
Payer: COMMERCIAL

## 2025-12-12 ENCOUNTER — APPOINTMENT (OUTPATIENT)
Dept: ENDOCRINOLOGY | Facility: CLINIC | Age: 45
End: 2025-12-12
Payer: COMMERCIAL

## 2026-04-21 ENCOUNTER — APPOINTMENT (OUTPATIENT)
Dept: ENDOCRINOLOGY | Facility: CLINIC | Age: 46
End: 2026-04-21
Payer: COMMERCIAL